# Patient Record
Sex: MALE | Race: WHITE | Employment: OTHER
[De-identification: names, ages, dates, MRNs, and addresses within clinical notes are randomized per-mention and may not be internally consistent; named-entity substitution may affect disease eponyms.]

---

## 2017-02-17 ENCOUNTER — TELEPHONE (OUTPATIENT)
Dept: RADIATION ONCOLOGY | Facility: MEDICAL CENTER | Age: 67
End: 2017-02-17

## 2017-03-28 ENCOUNTER — HOSPITAL ENCOUNTER (OUTPATIENT)
Dept: RADIATION ONCOLOGY | Facility: MEDICAL CENTER | Age: 67
Discharge: HOME | End: 2017-03-28
Payer: MEDICARE

## 2017-03-28 PROCEDURE — 99212 OFFICE O/P EST SF 10 MIN: CPT | Performed by: RADIOLOGY

## 2017-05-23 ENCOUNTER — HOSPITAL ENCOUNTER (OUTPATIENT)
Dept: GENERAL RADIOLOGY | Age: 67
Discharge: HOME | End: 2017-05-23
Payer: MEDICARE

## 2017-05-23 ENCOUNTER — HOSPITAL ENCOUNTER (OUTPATIENT)
Age: 67
Discharge: HOME | End: 2017-05-23
Payer: MEDICARE

## 2017-05-23 DIAGNOSIS — J44.9 CHRONIC OBSTRUCTIVE PULMONARY DISEASE, UNSPECIFIED COPD TYPE (HCC): ICD-10-CM

## 2017-05-23 PROCEDURE — 71020 XR CHEST STANDARD TWO VW: CPT

## 2017-06-13 ENCOUNTER — HOSPITAL ENCOUNTER (OUTPATIENT)
Age: 67
Discharge: HOME | End: 2017-06-13
Payer: MEDICARE

## 2017-06-13 LAB
ALBUMIN SERPL-MCNC: 4.1 G/DL (ref 3.5–5.2)
ALBUMIN/GLOBULIN RATIO: ABNORMAL (ref 1–2.5)
ALP BLD-CCNC: 78 U/L (ref 40–129)
ALT SERPL-CCNC: 13 U/L (ref 5–41)
ANION GAP SERPL CALCULATED.3IONS-SCNC: 12 MMOL/L (ref 9–17)
AST SERPL-CCNC: 15 U/L
BILIRUB SERPL-MCNC: 0.25 MG/DL (ref 0.3–1.2)
BUN BLDV-MCNC: 15 MG/DL (ref 8–23)
BUN/CREAT BLD: 21 (ref 9–20)
C-REACTIVE PROTEIN: 13 MG/L (ref 0–5)
CALCIUM SERPL-MCNC: 9.2 MG/DL (ref 8.6–10.4)
CHLORIDE BLD-SCNC: 100 MMOL/L (ref 98–107)
CHOLESTEROL, FASTING: 131 MG/DL
CHOLESTEROL/HDL RATIO: 2.6
CO2: 28 MMOL/L (ref 20–31)
CREAT SERPL-MCNC: 0.71 MG/DL (ref 0.7–1.2)
GFR AFRICAN AMERICAN: >60 ML/MIN
GFR NON-AFRICAN AMERICAN: >60 ML/MIN
GFR SERPL CREATININE-BSD FRML MDRD: ABNORMAL ML/MIN/{1.73_M2}
GFR SERPL CREATININE-BSD FRML MDRD: ABNORMAL ML/MIN/{1.73_M2}
GLUCOSE FASTING: 111 MG/DL (ref 70–99)
HCT VFR BLD CALC: 38.7 % (ref 41–53)
HDLC SERPL-MCNC: 51 MG/DL
HEMOGLOBIN: 12.8 G/DL (ref 13.5–17.5)
LDL CHOLESTEROL: 63 MG/DL (ref 0–130)
MCH RBC QN AUTO: 30.6 PG (ref 26–34)
MCHC RBC AUTO-ENTMCNC: 33.1 G/DL (ref 31–37)
MCV RBC AUTO: 92.4 FL (ref 80–100)
PDW BLD-RTO: 15.3 % (ref 11.5–14.5)
PLATELET # BLD: 273 K/UL (ref 130–400)
PMV BLD AUTO: 7.6 FL (ref 6–12)
POTASSIUM SERPL-SCNC: 4.6 MMOL/L (ref 3.7–5.3)
PROSTATE SPECIFIC ANTIGEN: 2.36 UG/L
RBC # BLD: 4.18 M/UL (ref 4.5–5.9)
SODIUM BLD-SCNC: 140 MMOL/L (ref 135–144)
TOTAL PROTEIN: 7.7 G/DL (ref 6.4–8.3)
TRIGLYCERIDE, FASTING: 85 MG/DL
TSH SERPL DL<=0.05 MIU/L-ACNC: 3.66 MIU/L (ref 0.3–5)
VLDLC SERPL CALC-MCNC: NORMAL MG/DL (ref 1–30)
WBC # BLD: 5.2 K/UL (ref 3.5–11)

## 2017-06-13 PROCEDURE — 80061 LIPID PANEL: CPT

## 2017-06-13 PROCEDURE — 80053 COMPREHEN METABOLIC PANEL: CPT

## 2017-06-13 PROCEDURE — 84443 ASSAY THYROID STIM HORMONE: CPT

## 2017-06-13 PROCEDURE — 85027 COMPLETE CBC AUTOMATED: CPT

## 2017-06-13 PROCEDURE — 84153 ASSAY OF PSA TOTAL: CPT

## 2017-06-13 PROCEDURE — 36415 COLL VENOUS BLD VENIPUNCTURE: CPT

## 2017-06-13 PROCEDURE — 86140 C-REACTIVE PROTEIN: CPT

## 2017-07-21 ENCOUNTER — HOSPITAL ENCOUNTER (OUTPATIENT)
Dept: CT IMAGING | Age: 67
Discharge: HOME | End: 2017-07-21
Payer: MEDICARE

## 2017-07-21 DIAGNOSIS — R91.1 NODULE OF LEFT LUNG: ICD-10-CM

## 2017-07-21 PROCEDURE — 71250 CT THORAX DX C-: CPT

## 2017-09-01 ENCOUNTER — HOSPITAL ENCOUNTER (OUTPATIENT)
Dept: NUCLEAR MEDICINE | Age: 67
Discharge: HOME | End: 2017-09-01
Payer: MEDICARE

## 2017-09-01 DIAGNOSIS — R91.1 LUNG NODULE: ICD-10-CM

## 2017-09-08 ENCOUNTER — HOSPITAL ENCOUNTER (OUTPATIENT)
Dept: NUCLEAR MEDICINE | Age: 67
Discharge: HOME | End: 2017-09-08
Payer: MEDICARE

## 2017-09-08 PROCEDURE — 3430000000 HC RX DIAGNOSTIC RADIOPHARMACEUTICAL: Performed by: INTERNAL MEDICINE

## 2017-09-08 PROCEDURE — A9552 F18 FDG: HCPCS | Performed by: INTERNAL MEDICINE

## 2017-09-08 PROCEDURE — 78815 PET IMAGE W/CT SKULL-THIGH: CPT

## 2017-09-08 RX ADMIN — FLUDEOXYGLUCOSE F 18 15.17 MILLICURIE: 200 INJECTION, SOLUTION INTRAVENOUS at 15:20

## 2017-09-09 RX ORDER — FLUDEOXYGLUCOSE F 18 200 MCI/ML
10 INJECTION, SOLUTION INTRAVENOUS
Status: COMPLETED | OUTPATIENT
Start: 2017-09-09 | End: 2017-09-08

## 2018-02-17 ENCOUNTER — HOSPITAL ENCOUNTER (OUTPATIENT)
Age: 68
Discharge: HOME | End: 2018-02-19
Payer: MEDICARE

## 2018-02-17 ENCOUNTER — HOSPITAL ENCOUNTER (OUTPATIENT)
Dept: GENERAL RADIOLOGY | Age: 68
Discharge: HOME | End: 2018-02-19
Payer: MEDICARE

## 2018-02-17 DIAGNOSIS — J44.9 CHRONIC OBSTRUCTIVE PULMONARY DISEASE, UNSPECIFIED COPD TYPE (HCC): ICD-10-CM

## 2018-02-17 PROCEDURE — 71046 X-RAY EXAM CHEST 2 VIEWS: CPT

## 2018-03-23 ENCOUNTER — HOSPITAL ENCOUNTER (OUTPATIENT)
Dept: NUCLEAR MEDICINE | Age: 68
Discharge: HOME | End: 2018-03-25
Payer: MEDICARE

## 2018-03-23 DIAGNOSIS — R91.1 NODULE OF LEFT LUNG: ICD-10-CM

## 2018-03-28 ENCOUNTER — HOSPITAL ENCOUNTER (OUTPATIENT)
Dept: RADIATION ONCOLOGY | Facility: MEDICAL CENTER | Age: 68
Discharge: HOME | End: 2018-03-28
Payer: MEDICARE

## 2018-03-28 PROCEDURE — 99212 OFFICE O/P EST SF 10 MIN: CPT | Performed by: RADIOLOGY

## 2018-05-11 ENCOUNTER — HOSPITAL ENCOUNTER (OUTPATIENT)
Dept: NUCLEAR MEDICINE | Age: 68
Discharge: HOME | End: 2018-05-13
Payer: MEDICARE

## 2018-05-25 ENCOUNTER — HOSPITAL ENCOUNTER (OUTPATIENT)
Dept: NUCLEAR MEDICINE | Age: 68
Discharge: HOME | End: 2018-05-27
Payer: MEDICARE

## 2018-05-25 PROCEDURE — 3430000000 HC RX DIAGNOSTIC RADIOPHARMACEUTICAL: Performed by: INTERNAL MEDICINE

## 2018-05-25 PROCEDURE — 78815 PET IMAGE W/CT SKULL-THIGH: CPT

## 2018-05-25 PROCEDURE — A9552 F18 FDG: HCPCS | Performed by: INTERNAL MEDICINE

## 2018-05-25 RX ORDER — FLUDEOXYGLUCOSE F 18 200 MCI/ML
17.3 INJECTION, SOLUTION INTRAVENOUS
Status: COMPLETED | OUTPATIENT
Start: 2018-05-25 | End: 2018-05-25

## 2018-05-25 RX ADMIN — FLUDEOXYGLUCOSE F 18 17.3 MILLICURIE: 200 INJECTION, SOLUTION INTRAVENOUS at 08:17

## 2018-07-31 ENCOUNTER — HOSPITAL ENCOUNTER (OUTPATIENT)
Dept: RADIATION ONCOLOGY | Facility: MEDICAL CENTER | Age: 68
Discharge: HOME | End: 2018-07-31
Payer: MEDICARE

## 2018-07-31 PROCEDURE — 99212 OFFICE O/P EST SF 10 MIN: CPT | Performed by: RADIOLOGY

## 2018-08-22 ENCOUNTER — HOSPITAL ENCOUNTER (OUTPATIENT)
Dept: RADIATION ONCOLOGY | Age: 68
Discharge: HOME | End: 2018-08-22
Payer: MEDICARE

## 2018-08-24 ENCOUNTER — HOSPITAL ENCOUNTER (OUTPATIENT)
Dept: RADIATION ONCOLOGY | Facility: MEDICAL CENTER | Age: 68
Discharge: HOME | End: 2018-08-24
Payer: MEDICARE

## 2018-08-24 PROCEDURE — 99212 OFFICE O/P EST SF 10 MIN: CPT | Performed by: RADIOLOGY

## 2018-08-27 ENCOUNTER — HOSPITAL ENCOUNTER (OUTPATIENT)
Dept: RADIATION ONCOLOGY | Age: 68
Discharge: HOME | End: 2018-08-27
Payer: MEDICARE

## 2018-08-27 PROCEDURE — 77290 THER RAD SIMULAJ FIELD CPLX: CPT | Performed by: RADIOLOGY

## 2018-08-27 PROCEDURE — 77334 RADIATION TREATMENT AID(S): CPT | Performed by: RADIOLOGY

## 2018-09-04 ENCOUNTER — HOSPITAL ENCOUNTER (OUTPATIENT)
Dept: RADIATION ONCOLOGY | Age: 68
Discharge: HOME | End: 2018-09-04
Payer: MEDICARE

## 2018-09-04 PROCEDURE — 77293 RESPIRATOR MOTION MGMT SIMUL: CPT | Performed by: RADIOLOGY

## 2018-09-04 PROCEDURE — 77301 RADIOTHERAPY DOSE PLAN IMRT: CPT | Performed by: RADIOLOGY

## 2018-09-04 PROCEDURE — 77338 DESIGN MLC DEVICE FOR IMRT: CPT | Performed by: RADIOLOGY

## 2018-09-04 PROCEDURE — 77300 RADIATION THERAPY DOSE PLAN: CPT | Performed by: RADIOLOGY

## 2018-09-10 PROCEDURE — 77373 STRTCTC BDY RAD THER TX DLVR: CPT | Performed by: RADIOLOGY

## 2018-09-10 PROCEDURE — 77280 THER RAD SIMULAJ FIELD SMPL: CPT | Performed by: RADIOLOGY

## 2018-09-10 PROCEDURE — 77336 RADIATION PHYSICS CONSULT: CPT | Performed by: RADIOLOGY

## 2018-09-13 PROCEDURE — 77373 STRTCTC BDY RAD THER TX DLVR: CPT | Performed by: RADIOLOGY

## 2018-09-17 PROCEDURE — 77373 STRTCTC BDY RAD THER TX DLVR: CPT | Performed by: RADIOLOGY

## 2018-09-20 PROCEDURE — 77373 STRTCTC BDY RAD THER TX DLVR: CPT | Performed by: RADIOLOGY

## 2018-12-10 ENCOUNTER — HOSPITAL ENCOUNTER (OUTPATIENT)
Dept: CT IMAGING | Age: 68
Discharge: HOME | End: 2018-12-12
Payer: MEDICARE

## 2018-12-10 DIAGNOSIS — R91.1 NODULE OF LEFT LUNG: ICD-10-CM

## 2018-12-10 PROCEDURE — 71250 CT THORAX DX C-: CPT

## 2018-12-30 PROBLEM — R91.1 PULMONARY NODULE: Status: ACTIVE | Noted: 2018-12-30

## 2018-12-30 PROBLEM — C34.12 PRIMARY MALIGNANT NEOPLASM OF BRONCHUS OF LEFT UPPER LOBE (HCC): Status: ACTIVE | Noted: 2018-12-30

## 2018-12-30 PROBLEM — C32.0 SQUAMOUS CELL CARCINOMA OF VOCAL CORD (HCC): Status: ACTIVE | Noted: 2018-12-30

## 2019-01-03 ENCOUNTER — HOSPITAL ENCOUNTER (OUTPATIENT)
Dept: RADIATION ONCOLOGY | Facility: MEDICAL CENTER | Age: 69
Discharge: HOME | End: 2019-01-03
Payer: MEDICARE

## 2019-01-03 VITALS
OXYGEN SATURATION: 89 % | WEIGHT: 119.25 LBS | BODY MASS INDEX: 16.69 KG/M2 | DIASTOLIC BLOOD PRESSURE: 74 MMHG | SYSTOLIC BLOOD PRESSURE: 117 MMHG | TEMPERATURE: 98.4 F | HEIGHT: 71 IN | HEART RATE: 95 BPM | RESPIRATION RATE: 16 BRPM

## 2019-01-03 DIAGNOSIS — C32.0 SQUAMOUS CELL CARCINOMA OF VOCAL CORD (HCC): ICD-10-CM

## 2019-01-03 DIAGNOSIS — C34.12 PRIMARY MALIGNANT NEOPLASM OF BRONCHUS OF LEFT UPPER LOBE (HCC): Primary | ICD-10-CM

## 2019-01-03 PROCEDURE — 99212 OFFICE O/P EST SF 10 MIN: CPT | Performed by: RADIOLOGY

## 2019-01-03 RX ORDER — ALBUTEROL SULFATE 90 UG/1
AEROSOL, METERED RESPIRATORY (INHALATION)
COMMUNITY

## 2019-01-03 RX ORDER — OMEPRAZOLE 20 MG/1
20 TABLET, DELAYED RELEASE ORAL
COMMUNITY

## 2019-01-03 RX ORDER — OXYCODONE AND ACETAMINOPHEN 10; 325 MG/1; MG/1
TABLET ORAL
COMMUNITY

## 2019-01-05 ENCOUNTER — HOSPITAL ENCOUNTER (OUTPATIENT)
Age: 69
Discharge: HOME | End: 2019-01-05
Payer: MEDICARE

## 2019-01-05 LAB
ABSOLUTE EOS #: 0 K/UL (ref 0–0.4)
ABSOLUTE IMMATURE GRANULOCYTE: 0 K/UL (ref 0–0.3)
ABSOLUTE LYMPH #: 0.52 K/UL (ref 1–4.8)
ABSOLUTE MONO #: 0.38 K/UL (ref 0.1–0.8)
ATYPICAL LYMPHOCYTE ABSOLUTE COUNT: 0.05 K/UL
ATYPICAL LYMPHOCYTES: 1 %
BASOPHILS # BLD: 0 % (ref 0–2)
BASOPHILS ABSOLUTE: 0 K/UL (ref 0–0.2)
DIFFERENTIAL TYPE: ABNORMAL
EOSINOPHILS RELATIVE PERCENT: 0 % (ref 1–4)
HCT VFR BLD CALC: 36.9 % (ref 40.7–50.3)
HEMOGLOBIN: 11.5 G/DL (ref 13–17)
IMMATURE GRANULOCYTES: 0 %
LYMPHOCYTES # BLD: 11 % (ref 24–44)
MCH RBC QN AUTO: 30.3 PG (ref 25.2–33.5)
MCHC RBC AUTO-ENTMCNC: 31.2 G/DL (ref 28.4–34.8)
MCV RBC AUTO: 97.4 FL (ref 82.6–102.9)
MONOCYTES # BLD: 8 % (ref 1–7)
MORPHOLOGY: NORMAL
NRBC AUTOMATED: 0 PER 100 WBC
PDW BLD-RTO: 13.7 % (ref 11.8–14.4)
PLATELET # BLD: 198 K/UL (ref 138–453)
PLATELET ESTIMATE: ABNORMAL
PMV BLD AUTO: 10.3 FL (ref 8.1–13.5)
RBC # BLD: 3.79 M/UL (ref 4.21–5.77)
RBC # BLD: ABNORMAL 10*6/UL
SEG NEUTROPHILS: 80 % (ref 36–66)
SEGMENTED NEUTROPHILS ABSOLUTE COUNT: 3.75 K/UL (ref 1.8–7.7)
WBC # BLD: 4.7 K/UL (ref 3.5–11.3)
WBC # BLD: ABNORMAL 10*3/UL

## 2019-01-05 PROCEDURE — 36415 COLL VENOUS BLD VENIPUNCTURE: CPT

## 2019-01-05 PROCEDURE — 85025 COMPLETE CBC W/AUTO DIFF WBC: CPT

## 2019-03-19 ENCOUNTER — HOSPITAL ENCOUNTER (OUTPATIENT)
Dept: CT IMAGING | Age: 69
Discharge: HOME | End: 2019-03-21
Payer: MEDICARE

## 2019-03-19 DIAGNOSIS — C34.12 PRIMARY MALIGNANT NEOPLASM OF BRONCHUS OF LEFT UPPER LOBE (HCC): ICD-10-CM

## 2019-03-19 DIAGNOSIS — C32.0 SQUAMOUS CELL CARCINOMA OF VOCAL CORD (HCC): ICD-10-CM

## 2019-03-19 PROCEDURE — 71250 CT THORAX DX C-: CPT

## 2019-03-20 ENCOUNTER — TELEPHONE (OUTPATIENT)
Dept: RADIATION ONCOLOGY | Facility: MEDICAL CENTER | Age: 69
End: 2019-03-20

## 2019-03-20 ENCOUNTER — TELEPHONE (OUTPATIENT)
Dept: RADIATION ONCOLOGY | Age: 69
End: 2019-03-20

## 2019-03-20 DIAGNOSIS — C34.12 PRIMARY MALIGNANT NEOPLASM OF BRONCHUS OF LEFT UPPER LOBE (HCC): Primary | ICD-10-CM

## 2019-03-20 DIAGNOSIS — C32.0 SQUAMOUS CELL CARCINOMA OF VOCAL CORD (HCC): ICD-10-CM

## 2019-04-04 ENCOUNTER — TELEPHONE (OUTPATIENT)
Dept: RADIATION ONCOLOGY | Facility: MEDICAL CENTER | Age: 69
End: 2019-04-04

## 2019-04-05 ENCOUNTER — TELEPHONE (OUTPATIENT)
Dept: RADIATION ONCOLOGY | Facility: MEDICAL CENTER | Age: 69
End: 2019-04-05

## 2019-04-05 NOTE — TELEPHONE ENCOUNTER
Abigail Veronica called back and I canceled Ynag's appointment on 4/11/19.  I rescheduled for 4/19/19 @ 2:15pm

## 2019-04-11 ENCOUNTER — APPOINTMENT (OUTPATIENT)
Dept: RADIATION ONCOLOGY | Facility: MEDICAL CENTER | Age: 69
End: 2019-04-11
Payer: MEDICARE

## 2019-04-19 ENCOUNTER — HOSPITAL ENCOUNTER (OUTPATIENT)
Dept: RADIATION ONCOLOGY | Facility: MEDICAL CENTER | Age: 69
Discharge: HOME | End: 2019-04-19
Payer: MEDICARE

## 2019-04-19 VITALS
SYSTOLIC BLOOD PRESSURE: 134 MMHG | OXYGEN SATURATION: 93 % | BODY MASS INDEX: 15.69 KG/M2 | DIASTOLIC BLOOD PRESSURE: 88 MMHG | TEMPERATURE: 98.2 F | WEIGHT: 112.5 LBS | HEART RATE: 90 BPM | RESPIRATION RATE: 16 BRPM

## 2019-04-19 DIAGNOSIS — C32.0 SQUAMOUS CELL CARCINOMA OF VOCAL CORD (HCC): ICD-10-CM

## 2019-04-19 DIAGNOSIS — R91.1 PULMONARY NODULE: ICD-10-CM

## 2019-04-19 DIAGNOSIS — R63.4 EXCESSIVE BODY WEIGHT LOSS: Primary | ICD-10-CM

## 2019-04-19 DIAGNOSIS — C34.12 PRIMARY MALIGNANT NEOPLASM OF BRONCHUS OF LEFT UPPER LOBE (HCC): ICD-10-CM

## 2019-04-19 PROCEDURE — 99212 OFFICE O/P EST SF 10 MIN: CPT | Performed by: RADIOLOGY

## 2019-04-19 ASSESSMENT — PAIN SCALES - GENERAL: PAINLEVEL_OUTOF10: 5

## 2019-04-19 NOTE — PROGRESS NOTES
Dm Sanchez  4/19/2019  2:21 PM     Pt here for f/u post RT to head and neck. Pt report throat pain at 5. Pt wears 02 now 24/7. No distress noted. Dr. Cornell Graham into eval.      Vitals:    04/19/19 1419   BP: 134/88   Pulse: 90   Resp: 16   Temp: 98.2 °F (36.8 °C)   SpO2: 93%    : Wt Readings from Last 1 Encounters:   04/19/19 112 lb 8 oz (51 kg)                Current Outpatient Medications:     albuterol sulfate HFA (PROAIR HFA) 108 (90 Base) MCG/ACT inhaler, ProAir HFA AERS  Refills: 0  Active As needed, Disp: , Rfl:     omeprazole (PRILOSEC OTC) 20 MG tablet, Take 20 mg by mouth, Disp: , Rfl:     oxyCODONE-acetaminophen (PERCOCET)  MG per tablet, Percocet TABS  Refills: 0  Active 1 tab every 4 hours, Disp: , Rfl:     Immunizations:    Influenza status:    [x]   Current   []   Patient declined    Pneumococcal status:  []   Current  [x]   Patient declined    Smoking Status:    [x] Smoker - PPD:0.5   [] Nonsmoker - Quit Date:               [] Never a smoker           Cancer Screening:  Colonoscopy [] Current [] Not current   [] Not current, but scheduled   [x] NA  Mammogram [] Current [] Not current   [] Not current, but scheduled   [x] NA  Prostate [x] Current [] Not current   [] Not current, but scheduled   [] NA  PAP/Pelvic [] Current [] Not current   [] Not current, but scheduled   [x] NA  Skin  [] Current  [] Not current   [] Not current, but scheduled   [x] NA    Hormone Injection:  Lupron []   Last dose given:           Next dose due:   Eligard []   Last dose given:           Next dose due:   N/A:  [x]         FALLS RISK SCREEN  Instructions:  Assess the patient and enter the appropriate indicators that are present for fall risk identification. Total the numbers entered and assign a fall risk score from Table 2.  Reassess patient at a minimum every 12 weeks or with status change. Assessment   Date  4/19/2019   1. Mental Ability: confusion/cognitively impaired 0 (3)   2.   Elimination Issues: incontinence, frequency 0 (3)   3. Ambulatory: use of assistive devices (walker, cane, off-loading devices),        attached to equipment (IV pole, oxygen) 0 (2)   4.  Sensory Limitations: dizziness, vertigo, impaired vision 0 (3)   5. Age less than 65                 0 (0)   6. Age 72 or greater 0 (1)   7. Medication: diuretics, strong analgesics, hypnotics, sedatives,        antihypertensive agents 0 (3)   8. Falls:  recent history of falls within the last 3 months (not to include slipping or        tripping) 0 (7)   TOTAL 0  If score of 4 or greater was education given? No         TABLE 2   Risk Score Risk Level Plan of Care   0-3 Little or  No Risk 1. Provide assistance as indicated for ambulation activities  2. Reorient confused/cognitively impaired patient  3. Chair/bed in low position, stretcher/bed with siderails up except when performing patient care activities  5. Educate patient/family/caregiver on falls prevention  6.  Reassess in 12 weeks or with any noted change in patient condition which places them at a risk for a fall   4-6 Moderate Risk 1. Provide assistance as indicated for ambulation activities  2. Reorient confused/cognitively impaired patient  3. Chair/bed in low position, stretcher/bed with siderails up except when performing patient care activities  4. Educate patient/family/caregiver on falls prevention     7 or   Higher High Risk 1. Place patient in easily observable treatment room  2. Patient attended at all times by family member or staff  3. Provide assistance as indicated for ambulation activities  4. Reorient confused/cognitively impaired patient  5. Chair/bed in low position, stretcher/bed with siderails up except when performing patient care activities  6.   Educate patient/family/caregiver on falls prevention         PLAN: Patient is seen today in follow up        Junior Cheek

## 2019-04-19 NOTE — PROGRESS NOTES
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 [facility])  Staging form: Lung, AJCC 8th Edition  - Clinical stage from 2018: Stage IA2 (cT1b, cN0, cM0) - Signed by Sasha Toledo MD on 2018  Staging comments: Clinical diagnosis, patient was not considered a safe candidate for biopsy related to COPD    Squamous cell carcinoma of vocal cord ([facility] [address]  Staging form: Larynx - Glottis, AJCC 6th Edition  - Clinical stage from 2009: Stage III (T3, N0, M0) - Signed by Sasha Toledo MD on 2018      Patient Active Problem List   Diagnosis    Squamous cell carcinoma of vocal cord ([facility] [address]    Pulmonary nodule    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 The patient was treated with concurrent carboplatin and Taxol with the radiation therapy. He completed ration therapy and  to a dose of 7000 cGy. The patient has never had recurrent disease with regards to the head and neck cancer. He was complaining of ear pain in the last few months. He did see Dr. Syeda Rose of ENT surgery more recently. He underwent an in office examination with an upper endoscopy which was reported as being negative. He takes Percocet for pain control. He takes 6-7 tablets per day. He denies pain in the region of the trach. He was told that he has vocal cord paralysis and history could not be moved. The patient continues to smoke tobacco.  He rates his appetite as good; he eats often.   Despite this, the patient has lost approximately 20 pounds in the last year. His wife notes that his performance status is decreased. She notes that he sleeps often. His taste her food is at 90% of pretreatment baseline. His salivation is at 40% of pretreatment baseline. He continues to have hoarseness of voice which is stable. The patient uses Dulcolax to keep his bowels are good. He denies urinary symptoms. He denies feeling lumps/bumps in the head and neck region or elsewhere. He denies dysphagia or odynophagia. The patient was also treated with external beam radiation therapy using SBRT for a clinical non-small cell lung cancer tumor, stage 1A2, T1b N0 M0. The patient received a dose of 4800 cGy to the tumor in the left upper lobe the lung which was completed on 9/20/18. The patient has COPD. He is on home oxygen. He wears it most of the day. This has improved his breathing. He admits that he continues to smoke tobacco.  This has been discussed with the patient previously. At this point, he does not want to stop smoking. The patient is able to perform his activities at home. He denies bone pain. He denies nausea, vomiting, abdominal pain.     MEDICATIONS:    Current Outpatient Medications:     albuterol sulfate HFA (PROAIR HFA) 108 (90 Base) MCG/ACT inhaler, ProAir HFA AERS  Refills: 0  Active As needed, Disp: , Rfl:     omeprazole (PRILOSEC OTC) 20 MG tablet, Take 20 mg by mouth, Disp: , Rfl:     oxyCODONE-acetaminophen (PERCOCET)  MG per tablet, Percocet TABS  Refills: 0  Active 1 tab every 4 hours, Disp: , Rfl:     ALLERGIES:  Allergies   Allergen Reactions    Aspirin Hives    Penicillins Hives       IMMUNIZATIONS:  Immunization History   Administered Date(s) Administered    Influenza Virus Vaccine 10/19/2018       SMOKING:  Social History     Tobacco Use   Smoking Status Current Every Day Smoker   Smokeless Tobacco Never Used     Ready to quit: Not Answered  Counseling given: Not Answered      WBC Date Value Ref Range Status   2019 4.7 3.5 - 11.3 k/uL Final     Hemoglobin   Date Value Ref Range Status   2019 11.5 (L) 13.0 - 17.0 g/dL Final     Platelet Count   Date Value Ref Range Status   2012 215 140 - 450 k/uL Final     Platelets   Date Value Ref Range Status   2019 198 138 - 453 k/uL Final      PSA   Date Value Ref Range Status   2017 2.36 <4.1 ug/L Final     Comment:     The Roche \"ECLIA\" assay is used. Results obtained with different assay methods   cannot be used interchangeably. Performed at [address] [phone]     2015 2.61 <4.1 ug/L Final     Comment:     The Roche \"ECLIA\" assay is used. Results obtained with different assay methods   cannot be used interchangeably. Performed at [address] [phone]     07/15/2014 4.08 <4.1 ug/L Final     Comment:     The Roche \"ECLIA\" assay is used. Results obtained with different assay methods   cannot be used interchangeably. Performed at [address] [phone]     2012 2.30 0 - 4 ug/L Final     Comment:     Performed at [address] [place] [address] 3 [phone]       REVIEW OF SYSTEMS:  Otherwise negative      PHYSICAL EXAMINATION:    CHAPERONE: Not Required    ECO Symptomatic but completely ambulatory    VITAL SIGNS: /88   Pulse 90   Temp 98.2 °F (36.8 °C) (Oral)   Resp 16   Wt 112 lb 8 oz (51 kg)   SpO2 93%   BMI 15.69 kg/m²   Wt Readings from Last 3 Encounters:   19 112 lb 8 oz (51 kg)   19 119 lb 4 oz (54.1 kg)     The patient is in no acute distress. He looks his stated age. He is a pleasant male. He is thin in appearance. EOMI. PERRLA. Visual examination and palpation of the oral cavity and oropharynx is unremarkable. Tongue mobility is normal.  The patient's gag reflex is intact.   There is no palpable cervical, supraclavicular, infraclavicular, axillary, inguinal, or femoral lymphadenopathy. S1 and S2 are present. No murmurs are heard. Regular, rate, and rhythm. The lungs are clear to auscultation. No wheezing or stridor is audible. The abdomen is soft and nontender. There are no palpable masses. There is no rebound tenderness or guarding noted. There is no palpable enlargement of the liver or spleen. No suprapubic tenderness is noted. No peripheral edema is noted. Alert and oriented ×3. Cranial nerves II through XII are intact. Strength and sensation are grossly intact. Gait is intact. Pain score: 4. Pain plan: Percocet. The patient deferred as the Olympus scope passed today as he had it done recently by Dr. Syeda Rose. ASSESSMENT AND PLAN:  The patient is a 77year-old male who has a diagnosis of larynx cancer and lung cancer as noted above. He was treated with radiation therapy to each of the sites as noted. His most recent radiation treatment was completed in September 2018. The patient had a CT scan of the chest ordered on 3/19/19. He has small nodules in his lung. Most of these are stable. He also has what appears to be a bone island at T7. I reviewed the images with the patient and his wife in the room today. The patient's ENT evaluation by Dr. Syeda Rose more recently was negative for recurrent disease. He did develop left ear pain. He does have a history of reflux and admits that he does not take his Prilosec. He will go back to using the Prilosec on a regular basis. I recommend that he uses daily. This may improve this symptom. If this symptom persists, the patient's wife will let me know. The patient has no evidence of recurrent disease within the neck. He has no bone on exam.  I will check a CT scan of the abdomen to rule out recurrent disease in the liver and/or adrenal glands. He continues to have weight loss which I cannot explain.   He also has a diagnosis of lung cancer as noted above. The patient was counseled about maintaining proper nutrition. He will increase the use of the boost.  The patient will also return to see his primary care physician next week will further evaluate weight loss. The patient is otherwise scheduled to see me in follow-up in 6 months. He will contact me sooner if needed.     Electronically signed by Nimco Tanner MD on 4/19/2019 at 3:38 PM  [address]      Medications Prescribed:   New Prescriptions    No medications on file       Orders:   Orders Placed This Encounter   Procedures    CT ABDOMEN W CONTRAST    Creatinine, Serum    BUN

## 2019-04-27 ENCOUNTER — HOSPITAL ENCOUNTER (OUTPATIENT)
Dept: CT IMAGING | Age: 69
Discharge: HOME | End: 2019-04-29
Payer: MEDICARE

## 2019-04-27 DIAGNOSIS — R91.1 PULMONARY NODULE: ICD-10-CM

## 2019-04-27 DIAGNOSIS — C34.12 PRIMARY MALIGNANT NEOPLASM OF BRONCHUS OF LEFT UPPER LOBE (HCC): ICD-10-CM

## 2019-04-27 DIAGNOSIS — C32.0 SQUAMOUS CELL CARCINOMA OF VOCAL CORD (HCC): ICD-10-CM

## 2019-04-27 DIAGNOSIS — R63.4 EXCESSIVE BODY WEIGHT LOSS: ICD-10-CM

## 2019-04-27 LAB
CREAT SERPL-MCNC: 0.62 MG/DL (ref 0.7–1.2)
GFR AFRICAN AMERICAN: >60 ML/MIN
GFR NON-AFRICAN AMERICAN: >60 ML/MIN
GFR SERPL CREATININE-BSD FRML MDRD: ABNORMAL ML/MIN/{1.73_M2}
GFR SERPL CREATININE-BSD FRML MDRD: ABNORMAL ML/MIN/{1.73_M2}

## 2019-04-27 PROCEDURE — 82565 ASSAY OF CREATININE: CPT

## 2019-04-27 PROCEDURE — 2580000003 HC RX 258: Performed by: RADIOLOGY

## 2019-04-27 PROCEDURE — 36415 COLL VENOUS BLD VENIPUNCTURE: CPT

## 2019-04-27 PROCEDURE — 6360000004 HC RX CONTRAST MEDICATION: Performed by: RADIOLOGY

## 2019-04-27 PROCEDURE — 74160 CT ABDOMEN W/CONTRAST: CPT

## 2019-04-27 RX ORDER — SODIUM CHLORIDE 0.9 % (FLUSH) 0.9 %
10 SYRINGE (ML) INJECTION
Status: COMPLETED | OUTPATIENT
Start: 2019-04-27 | End: 2019-04-27

## 2019-04-27 RX ORDER — 0.9 % SODIUM CHLORIDE 0.9 %
80 INTRAVENOUS SOLUTION INTRAVENOUS ONCE
Status: COMPLETED | OUTPATIENT
Start: 2019-04-27 | End: 2019-04-27

## 2019-04-27 RX ADMIN — IOHEXOL 30 ML: 300 INJECTION, SOLUTION INTRAVENOUS at 09:53

## 2019-04-27 RX ADMIN — Medication 10 ML: at 09:54

## 2019-04-27 RX ADMIN — SODIUM CHLORIDE 80 ML: 9 INJECTION, SOLUTION INTRAVENOUS at 09:54

## 2019-04-27 RX ADMIN — IOPAMIDOL 75 ML: 755 INJECTION, SOLUTION INTRAVENOUS at 09:53

## 2019-04-29 ENCOUNTER — TELEPHONE (OUTPATIENT)
Dept: RADIATION ONCOLOGY | Age: 69
End: 2019-04-29

## 2019-04-29 NOTE — TELEPHONE ENCOUNTER
I reviewed the results the CT of the abdomen which showed no evidence of metastatic disease to the cough with the patient's weight loss. I contacted the patient's spouse and gave her the results. She notes that the patient eats very well. He is to follow-up with his primary care physician soon. The patient also had a CT scan of the chest 1 month ago which showed no evidence of definite recurrence. As noted in my previous notes, the patient was treated for larynx cancer dating back to December 2009. He also more recently received SBR T for a non-small cell lung cancer in the left lung. This was completed in September 2018. The patient is otherwise scheduled to see me in follow-up in 6 months. The patient and his wife will discuss the patient's weight loss with the primary care physician. He'll contact me sooner if needed.

## 2019-08-07 ENCOUNTER — HOSPITAL ENCOUNTER (OUTPATIENT)
Age: 69
Discharge: HOME | End: 2019-08-07
Payer: MEDICARE

## 2019-08-07 LAB
ALBUMIN SERPL-MCNC: 4.1 G/DL (ref 3.5–5.2)
ALBUMIN/GLOBULIN RATIO: 1.2 (ref 1–2.5)
ALP BLD-CCNC: 78 U/L (ref 40–129)
ALT SERPL-CCNC: 11 U/L (ref 5–41)
ANION GAP SERPL CALCULATED.3IONS-SCNC: 11 MMOL/L (ref 9–17)
AST SERPL-CCNC: 11 U/L
BILIRUB SERPL-MCNC: 0.24 MG/DL (ref 0.3–1.2)
BUN BLDV-MCNC: 11 MG/DL (ref 8–23)
BUN/CREAT BLD: ABNORMAL (ref 9–20)
C-REACTIVE PROTEIN: 7 MG/L (ref 0–5)
CALCIUM SERPL-MCNC: 9 MG/DL (ref 8.6–10.4)
CHLORIDE BLD-SCNC: 101 MMOL/L (ref 98–107)
CHOLESTEROL, FASTING: 125 MG/DL
CHOLESTEROL/HDL RATIO: 2
CO2: 29 MMOL/L (ref 20–31)
CREAT SERPL-MCNC: 0.61 MG/DL (ref 0.7–1.2)
GFR AFRICAN AMERICAN: >60 ML/MIN
GFR NON-AFRICAN AMERICAN: >60 ML/MIN
GFR SERPL CREATININE-BSD FRML MDRD: ABNORMAL ML/MIN/{1.73_M2}
GFR SERPL CREATININE-BSD FRML MDRD: ABNORMAL ML/MIN/{1.73_M2}
GLUCOSE BLD-MCNC: 135 MG/DL (ref 70–99)
HCT VFR BLD CALC: 37.8 % (ref 40.7–50.3)
HDLC SERPL-MCNC: 61 MG/DL
HEMOGLOBIN: 11.5 G/DL (ref 13–17)
LDL CHOLESTEROL: 46 MG/DL (ref 0–130)
MCH RBC QN AUTO: 30.1 PG (ref 25.2–33.5)
MCHC RBC AUTO-ENTMCNC: 30.4 G/DL (ref 28.4–34.8)
MCV RBC AUTO: 99 FL (ref 82.6–102.9)
NRBC AUTOMATED: 0 PER 100 WBC
PDW BLD-RTO: 13.1 % (ref 11.8–14.4)
PLATELET # BLD: 190 K/UL (ref 138–453)
PMV BLD AUTO: 10.3 FL (ref 8.1–13.5)
POTASSIUM SERPL-SCNC: 3.4 MMOL/L (ref 3.7–5.3)
PROSTATE SPECIFIC ANTIGEN: 2.49 UG/L
RBC # BLD: 3.82 M/UL (ref 4.21–5.77)
SODIUM BLD-SCNC: 141 MMOL/L (ref 135–144)
TOTAL PROTEIN: 7.5 G/DL (ref 6.4–8.3)
TRIGLYCERIDE, FASTING: 91 MG/DL
TSH SERPL DL<=0.05 MIU/L-ACNC: 3.42 MIU/L (ref 0.3–5)
VLDLC SERPL CALC-MCNC: NORMAL MG/DL (ref 1–30)
WBC # BLD: 8.3 K/UL (ref 3.5–11.3)

## 2019-08-07 PROCEDURE — 36415 COLL VENOUS BLD VENIPUNCTURE: CPT

## 2019-08-07 PROCEDURE — 84443 ASSAY THYROID STIM HORMONE: CPT

## 2019-08-07 PROCEDURE — 84153 ASSAY OF PSA TOTAL: CPT

## 2019-08-07 PROCEDURE — 80053 COMPREHEN METABOLIC PANEL: CPT

## 2019-08-07 PROCEDURE — 86140 C-REACTIVE PROTEIN: CPT

## 2019-08-07 PROCEDURE — 85027 COMPLETE CBC AUTOMATED: CPT

## 2019-08-07 PROCEDURE — 80061 LIPID PANEL: CPT

## 2019-10-18 ENCOUNTER — TELEPHONE (OUTPATIENT)
Dept: RADIATION ONCOLOGY | Facility: MEDICAL CENTER | Age: 69
End: 2019-10-18

## 2019-12-04 ENCOUNTER — TELEPHONE (OUTPATIENT)
Dept: RADIATION ONCOLOGY | Facility: MEDICAL CENTER | Age: 69
End: 2019-12-04

## 2020-09-26 ENCOUNTER — HOSPITAL ENCOUNTER (OUTPATIENT)
Age: 70
Discharge: HOME | End: 2020-09-26
Payer: MEDICARE

## 2020-09-26 ENCOUNTER — HOSPITAL ENCOUNTER (OUTPATIENT)
Dept: CT IMAGING | Age: 70
Discharge: HOME | End: 2020-09-28
Payer: MEDICARE

## 2020-09-26 LAB
ABSOLUTE EOS #: 0 K/UL (ref 0–0.4)
ABSOLUTE IMMATURE GRANULOCYTE: 0 K/UL (ref 0–0.3)
ABSOLUTE LYMPH #: 0.74 K/UL (ref 1–4.8)
ABSOLUTE MONO #: 0.93 K/UL (ref 0.1–0.8)
ALBUMIN SERPL-MCNC: 3.3 G/DL (ref 3.5–5.2)
ALBUMIN/GLOBULIN RATIO: ABNORMAL (ref 1–2.5)
ALP BLD-CCNC: 65 U/L (ref 40–129)
ALT SERPL-CCNC: 8 U/L (ref 5–41)
ANION GAP SERPL CALCULATED.3IONS-SCNC: 11 MMOL/L (ref 9–17)
AST SERPL-CCNC: 9 U/L
BASOPHILS # BLD: 0 % (ref 0–2)
BASOPHILS ABSOLUTE: 0 K/UL (ref 0–0.2)
BILIRUB SERPL-MCNC: 0.34 MG/DL (ref 0.3–1.2)
BUN BLDV-MCNC: 13 MG/DL (ref 8–23)
BUN/CREAT BLD: 22 (ref 9–20)
CALCIUM SERPL-MCNC: 9.2 MG/DL (ref 8.6–10.4)
CHLORIDE BLD-SCNC: 95 MMOL/L (ref 98–107)
CO2: 31 MMOL/L (ref 20–31)
CREAT SERPL-MCNC: 0.59 MG/DL (ref 0.7–1.2)
DIFFERENTIAL TYPE: ABNORMAL
EOSINOPHILS RELATIVE PERCENT: 0 % (ref 1–4)
GFR AFRICAN AMERICAN: >60 ML/MIN
GFR NON-AFRICAN AMERICAN: >60 ML/MIN
GFR SERPL CREATININE-BSD FRML MDRD: ABNORMAL ML/MIN/{1.73_M2}
GFR SERPL CREATININE-BSD FRML MDRD: ABNORMAL ML/MIN/{1.73_M2}
GLUCOSE BLD-MCNC: 152 MG/DL (ref 70–99)
HCT VFR BLD CALC: 31.2 % (ref 40.7–50.3)
HEMOGLOBIN: 9.6 G/DL (ref 13–17)
IMMATURE GRANULOCYTES: 0 %
LYMPHOCYTES # BLD: 4 % (ref 24–44)
MCH RBC QN AUTO: 29.3 PG (ref 25.2–33.5)
MCHC RBC AUTO-ENTMCNC: 30.8 G/DL (ref 28.4–34.8)
MCV RBC AUTO: 95.1 FL (ref 82.6–102.9)
MONOCYTES # BLD: 5 % (ref 1–7)
MORPHOLOGY: ABNORMAL
NRBC AUTOMATED: 0 PER 100 WBC
PDW BLD-RTO: 14.6 % (ref 11.8–14.4)
PLATELET # BLD: 226 K/UL (ref 138–453)
PLATELET ESTIMATE: ABNORMAL
PMV BLD AUTO: 10.4 FL (ref 8.1–13.5)
POTASSIUM SERPL-SCNC: 3.5 MMOL/L (ref 3.7–5.3)
RBC # BLD: 3.28 M/UL (ref 4.21–5.77)
RBC # BLD: ABNORMAL 10*6/UL
SEG NEUTROPHILS: 91 % (ref 36–66)
SEGMENTED NEUTROPHILS ABSOLUTE COUNT: 16.83 K/UL (ref 1.8–7.7)
SODIUM BLD-SCNC: 137 MMOL/L (ref 135–144)
TOTAL PROTEIN: 6.7 G/DL (ref 6.4–8.3)
WBC # BLD: 18.5 K/UL (ref 3.5–11.3)
WBC # BLD: ABNORMAL 10*3/UL

## 2020-09-26 PROCEDURE — 80053 COMPREHEN METABOLIC PANEL: CPT

## 2020-09-26 PROCEDURE — 71260 CT THORAX DX C+: CPT

## 2020-09-26 PROCEDURE — 2580000003 HC RX 258: Performed by: RADIOLOGY

## 2020-09-26 PROCEDURE — 70491 CT SOFT TISSUE NECK W/DYE: CPT

## 2020-09-26 PROCEDURE — 36415 COLL VENOUS BLD VENIPUNCTURE: CPT

## 2020-09-26 PROCEDURE — 85025 COMPLETE CBC W/AUTO DIFF WBC: CPT

## 2020-09-26 PROCEDURE — 6360000004 HC RX CONTRAST MEDICATION: Performed by: RADIOLOGY

## 2020-09-26 RX ORDER — SODIUM CHLORIDE 0.9 % (FLUSH) 0.9 %
10 SYRINGE (ML) INJECTION PRN
Status: DISCONTINUED | OUTPATIENT
Start: 2020-09-26 | End: 2020-09-29 | Stop reason: HOSPADM

## 2020-09-26 RX ORDER — 0.9 % SODIUM CHLORIDE 0.9 %
80 INTRAVENOUS SOLUTION INTRAVENOUS ONCE
Status: COMPLETED | OUTPATIENT
Start: 2020-09-26 | End: 2020-09-26

## 2020-09-26 RX ADMIN — SODIUM CHLORIDE 80 ML: 9 INJECTION, SOLUTION INTRAVENOUS at 08:40

## 2020-09-26 RX ADMIN — Medication 10 ML: at 08:40

## 2020-09-26 RX ADMIN — IOPAMIDOL 80 ML: 755 INJECTION, SOLUTION INTRAVENOUS at 08:40
